# Patient Record
Sex: FEMALE | Race: WHITE | Employment: UNEMPLOYED | URBAN - METROPOLITAN AREA
[De-identification: names, ages, dates, MRNs, and addresses within clinical notes are randomized per-mention and may not be internally consistent; named-entity substitution may affect disease eponyms.]

---

## 2021-09-08 PROBLEM — Z34.90 SUPERVISION OF LOW-RISK PREGNANCY: Status: ACTIVE | Noted: 2021-09-08

## 2021-09-08 PROBLEM — J45.909 ASTHMA: Status: ACTIVE | Noted: 2021-09-08

## 2021-10-12 PROBLEM — O43.199 MARGINAL INSERTION OF UMBILICAL CORD AFFECTING MANAGEMENT OF MOTHER: Status: ACTIVE | Noted: 2021-10-12

## 2021-12-28 PROBLEM — O26.843 SIZE OF FETUS INCONSISTENT WITH DATES IN THIRD TRIMESTER: Status: ACTIVE | Noted: 2021-12-28

## 2022-02-11 PROBLEM — O36.5930 POOR FETAL GROWTH AFFECTING MANAGEMENT OF MOTHER IN THIRD TRIMESTER: Status: ACTIVE | Noted: 2022-02-11

## 2022-02-20 ENCOUNTER — HOSPITAL ENCOUNTER (INPATIENT)
Age: 32
LOS: 3 days | Discharge: HOME | End: 2022-02-23
Attending: OBSTETRICS & GYNECOLOGY | Admitting: OBSTETRICS & GYNECOLOGY
Payer: COMMERCIAL

## 2022-02-20 LAB
ABO + RH BLD: NORMAL
BLOOD GROUP ANTIBODIES SERPL: NORMAL
ERYTHROCYTE [DISTWIDTH] IN BLOOD BY AUTOMATED COUNT: 13.4 % (ref 11.9–14.6)
HCT VFR BLD AUTO: 38.5 % (ref 35.8–46.3)
HGB BLD-MCNC: 12.9 G/DL (ref 11.7–15.4)
MCH RBC QN AUTO: 30.7 PG (ref 26.1–32.9)
MCHC RBC AUTO-ENTMCNC: 33.5 G/DL (ref 31.4–35)
MCV RBC AUTO: 91.7 FL (ref 79.6–97.8)
NRBC # BLD: 0 K/UL (ref 0–0.2)
PLATELET # BLD AUTO: 237 K/UL (ref 150–450)
PMV BLD AUTO: 10.4 FL (ref 9.4–12.3)
RBC # BLD AUTO: 4.2 M/UL (ref 4.05–5.2)
SPECIMEN EXP DATE BLD: NORMAL
WBC # BLD AUTO: 14.9 K/UL (ref 4.3–11.1)

## 2022-02-20 PROCEDURE — 65270000029 HC RM PRIVATE

## 2022-02-20 PROCEDURE — 85027 COMPLETE CBC AUTOMATED: CPT

## 2022-02-20 PROCEDURE — 74011250637 HC RX REV CODE- 250/637: Performed by: OBSTETRICS & GYNECOLOGY

## 2022-02-20 PROCEDURE — 86900 BLOOD TYPING SEROLOGIC ABO: CPT

## 2022-02-20 PROCEDURE — 36415 COLL VENOUS BLD VENIPUNCTURE: CPT

## 2022-02-20 RX ORDER — LIDOCAINE HYDROCHLORIDE 20 MG/ML
JELLY TOPICAL
Status: DISCONTINUED | OUTPATIENT
Start: 2022-02-20 | End: 2022-02-21 | Stop reason: HOSPADM

## 2022-02-20 RX ORDER — BUTORPHANOL TARTRATE 2 MG/ML
1 INJECTION INTRAMUSCULAR; INTRAVENOUS
Status: DISCONTINUED | OUTPATIENT
Start: 2022-02-20 | End: 2022-02-21 | Stop reason: HOSPADM

## 2022-02-20 RX ORDER — LIDOCAINE HYDROCHLORIDE 10 MG/ML
1 INJECTION INFILTRATION; PERINEURAL
Status: DISCONTINUED | OUTPATIENT
Start: 2022-02-20 | End: 2022-02-21 | Stop reason: HOSPADM

## 2022-02-20 RX ORDER — MINERAL OIL
120 OIL (ML) ORAL
Status: COMPLETED | OUTPATIENT
Start: 2022-02-20 | End: 2022-02-21

## 2022-02-20 RX ADMIN — Medication 50 MCG: at 17:30

## 2022-02-20 RX ADMIN — Medication 50 MCG: at 21:36

## 2022-02-20 NOTE — PROGRESS NOTES
Pt arrived to L&D for scheduled ripening for IUGR. Pt to be ripened overnight and induced tomorrow AM. Pt denies ctx, LOF, bleeding. +FM.

## 2022-02-21 ENCOUNTER — ANESTHESIA (OUTPATIENT)
Dept: LABOR AND DELIVERY | Age: 32
End: 2022-02-21
Payer: COMMERCIAL

## 2022-02-21 ENCOUNTER — ANESTHESIA EVENT (OUTPATIENT)
Dept: LABOR AND DELIVERY | Age: 32
End: 2022-02-21
Payer: COMMERCIAL

## 2022-02-21 PROCEDURE — A4300 CATH IMPL VASC ACCESS PORTAL: HCPCS | Performed by: NURSE ANESTHETIST, CERTIFIED REGISTERED

## 2022-02-21 PROCEDURE — 77030011945 HC CATH URIN INT ST MENT -A

## 2022-02-21 PROCEDURE — 59400 OBSTETRICAL CARE: CPT | Performed by: OBSTETRICS & GYNECOLOGY

## 2022-02-21 PROCEDURE — 77030007880 HC KT SPN EPDRL BBMI -B: Performed by: NURSE ANESTHETIST, CERTIFIED REGISTERED

## 2022-02-21 PROCEDURE — 77030014125 HC TY EPDRL BBMI -B: Performed by: NURSE ANESTHETIST, CERTIFIED REGISTERED

## 2022-02-21 PROCEDURE — 74011000250 HC RX REV CODE- 250: Performed by: OBSTETRICS & GYNECOLOGY

## 2022-02-21 PROCEDURE — 2709999900 HC NON-CHARGEABLE SUPPLY

## 2022-02-21 PROCEDURE — 75410000003 HC RECOV DEL/VAG/CSECN EA 0.5 HR

## 2022-02-21 PROCEDURE — 3E0P7VZ INTRODUCTION OF HORMONE INTO FEMALE REPRODUCTIVE, VIA NATURAL OR ARTIFICIAL OPENING: ICD-10-PCS | Performed by: OBSTETRICS & GYNECOLOGY

## 2022-02-21 PROCEDURE — 0UQGXZZ REPAIR VAGINA, EXTERNAL APPROACH: ICD-10-PCS | Performed by: OBSTETRICS & GYNECOLOGY

## 2022-02-21 PROCEDURE — 74011250636 HC RX REV CODE- 250/636: Performed by: NURSE ANESTHETIST, CERTIFIED REGISTERED

## 2022-02-21 PROCEDURE — 74011250636 HC RX REV CODE- 250/636: Performed by: OBSTETRICS & GYNECOLOGY

## 2022-02-21 PROCEDURE — 75410000002 HC LABOR FEE PER 1 HR

## 2022-02-21 PROCEDURE — 74011250637 HC RX REV CODE- 250/637: Performed by: OBSTETRICS & GYNECOLOGY

## 2022-02-21 PROCEDURE — 77030003028 HC SUT VCRL J&J -A

## 2022-02-21 PROCEDURE — 75410000000 HC DELIVERY VAGINAL/SINGLE

## 2022-02-21 PROCEDURE — 77030039147 HC PWDR HEMSTS SURGICEL JNJ -D

## 2022-02-21 PROCEDURE — 74011000250 HC RX REV CODE- 250: Performed by: NURSE ANESTHETIST, CERTIFIED REGISTERED

## 2022-02-21 PROCEDURE — 74011000250 HC RX REV CODE- 250: Performed by: ANESTHESIOLOGY

## 2022-02-21 PROCEDURE — 76060000078 HC EPIDURAL ANESTHESIA

## 2022-02-21 PROCEDURE — 77030018846 HC SOL IRR STRL H20 ICUM -A

## 2022-02-21 PROCEDURE — 00HU33Z INSERTION OF INFUSION DEVICE INTO SPINAL CANAL, PERCUTANEOUS APPROACH: ICD-10-PCS | Performed by: ANESTHESIOLOGY

## 2022-02-21 PROCEDURE — 65270000029 HC RM PRIVATE

## 2022-02-21 RX ORDER — EPHEDRINE SULFATE/0.9% NACL/PF 50 MG/5 ML
SYRINGE (ML) INTRAVENOUS AS NEEDED
Status: DISCONTINUED | OUTPATIENT
Start: 2022-02-21 | End: 2022-02-21 | Stop reason: HOSPADM

## 2022-02-21 RX ORDER — SODIUM CHLORIDE 0.9 % (FLUSH) 0.9 %
5-40 SYRINGE (ML) INJECTION EVERY 8 HOURS
Status: DISCONTINUED | OUTPATIENT
Start: 2022-02-21 | End: 2022-02-21

## 2022-02-21 RX ORDER — DIPHENHYDRAMINE HCL 25 MG
25 CAPSULE ORAL
Status: DISCONTINUED | OUTPATIENT
Start: 2022-02-21 | End: 2022-02-23 | Stop reason: HOSPADM

## 2022-02-21 RX ORDER — ROPIVACAINE HYDROCHLORIDE 2 MG/ML
INJECTION, SOLUTION EPIDURAL; INFILTRATION; PERINEURAL
Status: DISCONTINUED | OUTPATIENT
Start: 2022-02-21 | End: 2022-02-21 | Stop reason: HOSPADM

## 2022-02-21 RX ORDER — IBUPROFEN 600 MG/1
600 TABLET ORAL EVERY 6 HOURS
Status: DISCONTINUED | OUTPATIENT
Start: 2022-02-21 | End: 2022-02-23 | Stop reason: HOSPADM

## 2022-02-21 RX ORDER — ONDANSETRON 2 MG/ML
4 INJECTION INTRAMUSCULAR; INTRAVENOUS
Status: DISCONTINUED | OUTPATIENT
Start: 2022-02-21 | End: 2022-02-21

## 2022-02-21 RX ORDER — SODIUM CHLORIDE 0.9 % (FLUSH) 0.9 %
5-40 SYRINGE (ML) INJECTION AS NEEDED
Status: DISCONTINUED | OUTPATIENT
Start: 2022-02-21 | End: 2022-02-21

## 2022-02-21 RX ORDER — LIDOCAINE HYDROCHLORIDE AND EPINEPHRINE 15; 5 MG/ML; UG/ML
INJECTION, SOLUTION EPIDURAL AS NEEDED
Status: DISCONTINUED | OUTPATIENT
Start: 2022-02-21 | End: 2022-02-21

## 2022-02-21 RX ORDER — LIDOCAINE HYDROCHLORIDE AND EPINEPHRINE 20; 5 MG/ML; UG/ML
INJECTION, SOLUTION EPIDURAL; INFILTRATION; INTRACAUDAL; PERINEURAL AS NEEDED
Status: DISCONTINUED | OUTPATIENT
Start: 2022-02-21 | End: 2022-02-21 | Stop reason: HOSPADM

## 2022-02-21 RX ORDER — DOCUSATE SODIUM 100 MG/1
100 CAPSULE, LIQUID FILLED ORAL 2 TIMES DAILY
Status: DISCONTINUED | OUTPATIENT
Start: 2022-02-21 | End: 2022-02-23 | Stop reason: HOSPADM

## 2022-02-21 RX ORDER — OXYTOCIN/RINGER'S LACTATE 30/500 ML
2 PLASTIC BAG, INJECTION (ML) INTRAVENOUS
Status: DISCONTINUED | OUTPATIENT
Start: 2022-02-21 | End: 2022-02-21

## 2022-02-21 RX ORDER — OXYCODONE HYDROCHLORIDE 5 MG/1
5-10 TABLET ORAL
Status: DISCONTINUED | OUTPATIENT
Start: 2022-02-21 | End: 2022-02-23 | Stop reason: HOSPADM

## 2022-02-21 RX ORDER — ONDANSETRON 2 MG/ML
4 INJECTION INTRAMUSCULAR; INTRAVENOUS
Status: DISCONTINUED | OUTPATIENT
Start: 2022-02-21 | End: 2022-02-23 | Stop reason: HOSPADM

## 2022-02-21 RX ORDER — FENTANYL CITRATE 50 UG/ML
INJECTION, SOLUTION INTRAMUSCULAR; INTRAVENOUS
Status: COMPLETED
Start: 2022-02-21 | End: 2022-02-21

## 2022-02-21 RX ORDER — FENTANYL CITRATE 50 UG/ML
INJECTION, SOLUTION INTRAMUSCULAR; INTRAVENOUS AS NEEDED
Status: DISCONTINUED | OUTPATIENT
Start: 2022-02-21 | End: 2022-02-21 | Stop reason: HOSPADM

## 2022-02-21 RX ORDER — SIMETHICONE 80 MG
80 TABLET,CHEWABLE ORAL
Status: DISCONTINUED | OUTPATIENT
Start: 2022-02-21 | End: 2022-02-23 | Stop reason: HOSPADM

## 2022-02-21 RX ORDER — DEXTROSE, SODIUM CHLORIDE, SODIUM LACTATE, POTASSIUM CHLORIDE, AND CALCIUM CHLORIDE 5; .6; .31; .03; .02 G/100ML; G/100ML; G/100ML; G/100ML; G/100ML
125 INJECTION, SOLUTION INTRAVENOUS CONTINUOUS
Status: DISCONTINUED | OUTPATIENT
Start: 2022-02-21 | End: 2022-02-21

## 2022-02-21 RX ORDER — ACETAMINOPHEN 325 MG/1
650 TABLET ORAL EVERY 4 HOURS
Status: DISCONTINUED | OUTPATIENT
Start: 2022-02-21 | End: 2022-02-22

## 2022-02-21 RX ORDER — LIDOCAINE HYDROCHLORIDE AND EPINEPHRINE 15; 5 MG/ML; UG/ML
INJECTION, SOLUTION EPIDURAL
Status: COMPLETED | OUTPATIENT
Start: 2022-02-21 | End: 2022-02-21

## 2022-02-21 RX ORDER — FENTANYL CITRATE 50 UG/ML
100 INJECTION, SOLUTION INTRAMUSCULAR; INTRAVENOUS ONCE
Status: DISCONTINUED | OUTPATIENT
Start: 2022-02-21 | End: 2022-02-21

## 2022-02-21 RX ORDER — OXYTOCIN/RINGER'S LACTATE 30/500 ML
87.3 PLASTIC BAG, INJECTION (ML) INTRAVENOUS AS NEEDED
Status: DISCONTINUED | OUTPATIENT
Start: 2022-02-21 | End: 2022-02-21

## 2022-02-21 RX ORDER — SILVER NITRATE 38.21; 12.74 MG/1; MG/1
1 STICK TOPICAL
Status: COMPLETED | OUTPATIENT
Start: 2022-02-21 | End: 2022-02-21

## 2022-02-21 RX ORDER — OXYTOCIN/RINGER'S LACTATE 30/500 ML
0-20 PLASTIC BAG, INJECTION (ML) INTRAVENOUS
Status: DISCONTINUED | OUTPATIENT
Start: 2022-02-21 | End: 2022-02-21

## 2022-02-21 RX ORDER — OXYTOCIN/RINGER'S LACTATE 30/500 ML
10 PLASTIC BAG, INJECTION (ML) INTRAVENOUS AS NEEDED
Status: DISCONTINUED | OUTPATIENT
Start: 2022-02-21 | End: 2022-02-21

## 2022-02-21 RX ADMIN — Medication 1 SPRAY: at 12:15

## 2022-02-21 RX ADMIN — IBUPROFEN 600 MG: 600 TABLET ORAL at 17:48

## 2022-02-21 RX ADMIN — FENTANYL CITRATE 100 MCG: 50 INJECTION INTRAMUSCULAR; INTRAVENOUS at 04:25

## 2022-02-21 RX ADMIN — Medication 2 MILLI-UNITS/MIN: at 08:22

## 2022-02-21 RX ADMIN — IBUPROFEN 600 MG: 600 TABLET ORAL at 23:16

## 2022-02-21 RX ADMIN — MINERAL OIL 120 ML: 1000 LIQUID ORAL at 08:49

## 2022-02-21 RX ADMIN — Medication 1 APPLICATOR: at 10:45

## 2022-02-21 RX ADMIN — ONDANSETRON 4 MG: 2 INJECTION INTRAMUSCULAR; INTRAVENOUS at 01:22

## 2022-02-21 RX ADMIN — DOCUSATE SODIUM 100 MG: 100 CAPSULE ORAL at 17:48

## 2022-02-21 RX ADMIN — LIDOCAINE HYDROCHLORIDE,EPINEPHRINE BITARTRATE 4.5 ML: 15; .005 INJECTION, SOLUTION EPIDURAL; INFILTRATION; INTRACAUDAL; PERINEURAL at 04:24

## 2022-02-21 RX ADMIN — ACETAMINOPHEN 650 MG: 325 TABLET, FILM COATED ORAL at 23:15

## 2022-02-21 RX ADMIN — DOCUSATE SODIUM 100 MG: 100 CAPSULE ORAL at 12:16

## 2022-02-21 RX ADMIN — ROPIVACAINE HYDROCHLORIDE 8 ML/HR: 2 INJECTION, SOLUTION EPIDURAL; INFILTRATION at 04:32

## 2022-02-21 RX ADMIN — Medication 10 MG: at 04:31

## 2022-02-21 RX ADMIN — BUTORPHANOL TARTRATE 1 MG: 2 INJECTION, SOLUTION INTRAMUSCULAR; INTRAVENOUS at 01:22

## 2022-02-21 RX ADMIN — ACETAMINOPHEN 650 MG: 325 TABLET, FILM COATED ORAL at 16:10

## 2022-02-21 RX ADMIN — IBUPROFEN 600 MG: 600 TABLET ORAL at 12:16

## 2022-02-21 RX ADMIN — OXYCODONE 10 MG: 5 TABLET ORAL at 13:52

## 2022-02-21 RX ADMIN — LIDOCAINE HYDROCHLORIDE,EPINEPHRINE BITARTRATE 5 ML: 20; .005 INJECTION, SOLUTION EPIDURAL; INFILTRATION; INTRACAUDAL; PERINEURAL at 08:59

## 2022-02-21 RX ADMIN — WITCH HAZEL 1 PAD: 500 SOLUTION RECTAL; TOPICAL at 12:15

## 2022-02-21 NOTE — PROGRESS NOTES
02/21/22 0114   Cervical Exam   Dilation (cm) 2   Eff 90 %   Station -2   Position Mid   Cervical Consistency Soft   Vaginal exam done by?  cb, rn   Baby Position Vertex   pt called RN to bedside. Reports pain 7/10. Requesting IV pain medication at this time. SVE as above.

## 2022-02-21 NOTE — PROGRESS NOTES
SBAR OUT Report: BABY    Verbal report given to Selin Lorenzo (full name and credentials) on this patient, being transferred to MIU (unit) for routine progression of care. Report consisted of Situation, Background, Assessment, and Recommendations (SBAR).  ID bands were compared with the identification form, and verified with the patient's mother and receiving nurse. Information from the SBAR and the Leann Report was reviewed with the receiving nurse. According to the estimated gestational age scale, this infant is AGA. BETA STREP:   The mother's Group Beta Strep (GBS) result was negative. Prenatal care was received by this patients mother. Opportunity for questions and clarification provided.

## 2022-02-21 NOTE — PROGRESS NOTES
Safety Teaching reviewed:   1. Hand hygiene prior to handling the infant. 2. Use of bulb syringe  3. Bracelets with matching numbers are placed on mother and infant  3. An infant security tag  [facility]) is placed on the infant's ankle and monitored  5. All OB nurses wear pink Employee badges - do not give your baby to anyone without proper identification. 6. Never leave the baby alone in the room. 7. The infant should be placed on their back to sleep. on a firm mattress. No toys should be placed in the crib. (safe sleep video offered to view)  8. Never shake the baby (video offered to view)  9. Infant fall prevention - do not sleep with the baby, and place the baby in the crib while ambulating. 8. Mother and Baby Care booklet given to Mother.

## 2022-02-21 NOTE — L&D DELIVERY NOTE
Delivery Summary    Patient: Adenike Scales MRN: [id number]  SSN: [id number]    YOB: 1990  Age: 27 y.o. Sex: female       Information for the patient's :  Rosalinda Carrel [[id number]]       Labor Events:    Labor: No    Steroids: None   Cervical Ripening Date/Time: 2022 5:30 PM   Cervical Ripening Type: Misoprostol   Antibiotics During Labor: No   Rupture Identifier:      Rupture Date/Time: 2022 10:20 PM   Rupture Type: SROM   Amniotic Fluid Volume:  Moderate    Amniotic Fluid Description: Clear    Amniotic Fluid Odor: None    Induction: None       Induction Date/Time:        Indications for Induction:      Augmentation: Oxytocin   Augmentation Date/Time: 28:22 AM   Indications for Augmentation: Ineffective Contraction Pattern   Labor complications: None       Additional complications:        Delivery Events:  Indications For Episiotomy:     Episiotomy:     Perineal Laceration(s):     Repaired:     Periurethral Laceration Location:      Repaired:     Labial Laceration Location:     Repaired:     Sulcal Laceration Location:     Repaired:     Vaginal Laceration Location: bilateral   Repaired: Yes   Cervical Laceration Location:     Repaired:     Repair Suture: Vicryl 3-0   Number of Repair Packets:     Estimated Blood Loss (ml):  ml   Quantitative Blood Loss (ml)                Delivery Date: 2022    Delivery Time: 9:00 AM  Delivery Type: Vaginal, Spontaneous  Sex:  Female    Gestational Age: 39w0d   Delivery Clinician:  Shyann Hyatt  Living Status: Living   Delivery Location: &D [facility]          APGARS  One minute Five minutes Ten minutes   Skin color: 0   1        Heart rate: 2   2        Grimace: 2   2        Muscle tone: 2   2        Breathin   2        Totals: 8   9            Presentation: Vertex    Position: Right Occiput Anterior  Resuscitation Method:  Tactile Stimulation;Suctioning-bulb     Meconium Stained: None      Cord Information: 3 Vessels  Complications: None  Cord around:    Delayed cord clamping? Cord clamped date/time:2022  9:01 AM  Disposition of Cord Blood: Lab    Blood Gases Sent?: No    Placenta:  Date/Time: 2022  9:03 AM  Removal: Spontaneous      Appearance: Normal      Measurements:  Birth Weight:        Birth Length:        Head Circumference:        Chest Circumference:       Abdominal Girth: Other Providers:   DILLON Petty;DIAMOND MOURA;YASH MENENDEZ;HERIBERTO SAMANO;GARRY SWANSON;MINA KHAN;VALENCIA SÁNCHEZ, Obstetrician;Primary Nurse;Primary  Nurse; Anesthesiologist;Crna;Staff Nurse;Charge Nurse;Scrub Tech           Group B Strep: No results found for: GRBSEXT, GRBSEXT  Information for the patient's :  Angélica Casey [[id number]]   No results found for: ABORH, PCTABR, PCTDIG, BILI, ABORHEXT, ABORH     No results for input(s): PCO2CB, PO2CB, HCO3I, SO2I, IBD, PTEMPI, SPECTI, PHICB, ISITE, IDEV, IALLEN in the last 72 hours.  of a Viable I on 22  Apgars 8, 9  C/C/+2-->pushed to deliver head onto intact perineum. Body followed easily thereafter. Delayed cord clamping, baby to mom. Cord clamped/cut. Cord gases were drawn. Placenta delivered S/I/3VC. Bilateral deep vaginal lacerations noted. Additionally at midline entire vaginal mucosa was sheared off creating a large amount of dead space. Rectal exam was performed with intact rectal sphincter. This was repaired in a running locking fashion and with figure of eight sutures using Vicryl. With every stick the mucosal tissue become more oozy. Hemostasis was eventually achieved after several sutures. Repeat rectal exam was performed with no sutures present and intact sphincter. Vaginal packing was placed. Will re-evaluate in 6 hours and likely remove. Bladder was emptied. FF w/ pit and massage. QBL per RN. .  Mom/baby stable.        Amber Hyatt, DO

## 2022-02-21 NOTE — PROGRESS NOTES
Called back to bedside as patient saturated vaginal packing. Packing removed and 1 area of brisk oozing noted at area of prior figure of eight suture with the other areas with slow ooze. 2 additional figure of eights placed with heavy bleeding controlled. I asked the hospitalist to step in to evaluate with me as patient has already had several sutures and a vaginal packing. While on the way pressure was held. Vaginal mucosa with mild oozing, but overall much improved after pressure was held and additional sutures were placed. Mild oozing noted on denuded edges, but otherwise heavy bleeding controlled. Neetu and silver nitrate was used on the denuded and friable edges of mucosa. Another vaginal pack was placed as well as العلي catheter.      Amber Hyatt, DO

## 2022-02-21 NOTE — PROGRESS NOTES
Eladia Hoover at bedside at 2867. CRNA Griffith Lesches bedside at 3101    Assisted pt to sitting up on bedside at 0412. Timeout completed at 8294 with MD, JIM and myself at bedside. Test dose given at 0424. Negative reaction. Pt assisted to lying back in left tilt position. See anesthesia record for details. See vital sign flow sheet for BP. Tolerated procedure well.

## 2022-02-21 NOTE — PROGRESS NOTES
02/21/22 5487   Cervical Exam   Dilation (cm) 10   Eff 100 %   Station +1   Vaginal exam done by?  cb, rn   Baby Position Vertex   Dilation Complete Date 02/21/22   Dilation Time Complete 9415   Second Stage of Labor   Date Provider Notified 02/21/22   Time Provider Notified 1365   Dr. Sonam Hinson updated on pt status.

## 2022-02-21 NOTE — ANESTHESIA PREPROCEDURE EVALUATION
Relevant Problems   No relevant active problems       Anesthetic History   No history of anesthetic complications            Review of Systems / Medical History  Patient summary reviewed and pertinent labs reviewed    Pulmonary            Asthma : well controlled       Neuro/Psych   Within defined limits           Cardiovascular  Within defined limits                Exercise tolerance: >4 METS     GI/Hepatic/Renal     GERD: well controlled           Endo/Other  Within defined limits           Other Findings              Physical Exam    Airway  Mallampati: II  TM Distance: 4 - 6 cm  Neck ROM: normal range of motion   Mouth opening: Normal     Cardiovascular  Regular rate and rhythm,  S1 and S2 normal,  no murmur, click, rub, or gallop             Dental         Pulmonary  Breath sounds clear to auscultation               Abdominal         Other Findings            Anesthetic Plan    ASA: 2  Anesthesia type: CSE      Post-op pain plan if not by surgeon: indwelling epidural catheter, epidural opioid and intrathecal opiates      Anesthetic plan and risks discussed with: Patient and Spouse

## 2022-02-21 NOTE — PROGRESS NOTES
Moderate bleeding noted on fundal exam. Vag pack saturated. Dr. Caitlyn Lawton notified. MD will come assess.

## 2022-02-21 NOTE — PROGRESS NOTES
Iván Alvarez at bedside for delivery. 1079- Pt up in [facility]. 0900- Delivery of viable female infant. Apgars 8,9.  [id number]- Delivery of placenta. Pitocin bolus started. Repair in progress.

## 2022-02-21 NOTE — PROGRESS NOTES
SBAR IN Report: Mother    Verbal report received from RANDY Conway RN (full name & credentials) on this patient, who is now being transferred from [facility] (unit) for routine progression of care. The patient is not wearing a green \"Anesthesia-Duramorph\" band. Report consisted of patient's Situation, Background, Assessment and Recommendations (SBAR).  ID bands were compared with the identification form, and verified with the patient and transferring nurse. Information from the Procedure Summary and the Leann Report was reviewed with the transferring nurse; opportunity for questions and clarification provided.

## 2022-02-21 NOTE — PROGRESS NOTES
Pt called RN to bedside. States she felt a \"gush of fluid and blood. \" Nitrazine positive.  SVE 2/60/-2

## 2022-02-21 NOTE — H&P
History and Physical    Labor Induction Admission Note    Kayli Fritz  [id number]  unknown    OB History        2    Para        Term                AB   1    Living           SAB   1    IAB        Ectopic        Molar        Multiple        Live Births                    Patient admitted with pregnancy at 39w0d for induction of labor due to IUGR. Prenatal course has otherwise been uncomplicated. She received 2 doses of cytotec overnight. Prior to Admission Medications   Prescriptions Last Dose Informant Patient Reported? Taking?   prenatal multivit-ca-min-fe-fa tab 2022 at Unknown time  Yes Yes   Sig: Take  by mouth. Facility-Administered Medications: None         EXAM: Cervical Exam:  C/c/+1 per RN. Pushing starting now     Membranes: SROM around 2200                Fetal Heart Rate: cat1       Labs:   Lab Results   Component Value Date/Time    ABO/Rh(D) AB POSITIVE 2022 05:09 PM    Antibody screen NEG 2022 05:09 PM    Antibody screen, External Negative 2021 12:00 AM    HBsAg, External Negative 2021 12:00 AM    HIV, External Non reactive 2021 12:00 AM    Rubella, External Immune 2021 12:00 AM    ABO,Rh AB positive 2021 12:00 AM          Plan: Admit for induction of labor. Group B Strep negative. - Complete. Start pushing. FHTs reassuring.      Bhumika Guevara DO  2022   7:43 AM

## 2022-02-21 NOTE — PROGRESS NOTES
Dr. Shyla Genao at bedside. Bleeding assessed. Pt back up in [facility] for repair. Repair in progress at this time.

## 2022-02-21 NOTE — PROGRESS NOTES
SBAR OUT Report: Mother    Verbal report given to Kirill Lucero (full name & credentials) on this patient, who is now being transferred to MIU (unit) for routine progression of care. The patient is not wearing a green \"Anesthesia-Duramorph\" band. Report consisted of patient's Situation, Background, Assessment and Recommendations (SBAR). [place] ID bands were compared with the identification form, and verified with the patient and receiving nurse. Information from the SBAR and the [employer] Report was reviewed with the receiving nurse; opportunity for questions and clarification provided.

## 2022-02-21 NOTE — PROGRESS NOTES
Second repair complete. Vag pack replaced by Dr. Shyla Genao. Counts correct. Per MD, she will reassess in 1 hr.

## 2022-02-21 NOTE — LACTATION NOTE

## 2022-02-21 NOTE — PROGRESS NOTES
Spoke with Dr. Andrea Mueller on the phone. MD states he will s/w Dr. Sampson Hurtado regarding delivery- will await MD order prior to initiating pushing.

## 2022-02-21 NOTE — LACTATION NOTE
This note was copied from a baby's chart. In to see mom and infant for the first time. Mom stated that infant was ready to nurse. Assisted mom with placing infant to her right breast in the football hold. Infant latched and started to suck rhythmically. Infant nursed for 15 minutes and came off the breast content. Mom then placed infant to her left breast in the cross cradle hold but mom couldn't get infant into a comfortable positron. Mom then placed infant in the football hold and infant latched and sucked rhythmically for 5 minutes and came off the breast content. Reviewed the expectations of the first 24 hours. Lactation consultant will follow up tomorrow.

## 2022-02-22 PROCEDURE — 65270000029 HC RM PRIVATE

## 2022-02-22 PROCEDURE — 74011250637 HC RX REV CODE- 250/637: Performed by: OBSTETRICS & GYNECOLOGY

## 2022-02-22 PROCEDURE — 2709999900 HC NON-CHARGEABLE SUPPLY

## 2022-02-22 RX ORDER — ACETAMINOPHEN 325 MG/1
650 TABLET ORAL EVERY 4 HOURS
Status: DISCONTINUED | OUTPATIENT
Start: 2022-02-22 | End: 2022-02-23 | Stop reason: HOSPADM

## 2022-02-22 RX ADMIN — IBUPROFEN 600 MG: 600 TABLET ORAL at 05:53

## 2022-02-22 RX ADMIN — ACETAMINOPHEN 650 MG: 325 TABLET, FILM COATED ORAL at 05:53

## 2022-02-22 RX ADMIN — ACETAMINOPHEN 650 MG: 325 TABLET, FILM COATED ORAL at 10:10

## 2022-02-22 RX ADMIN — ACETAMINOPHEN 650 MG: 325 TABLET, FILM COATED ORAL at 13:57

## 2022-02-22 RX ADMIN — DOCUSATE SODIUM 100 MG: 100 CAPSULE ORAL at 08:23

## 2022-02-22 RX ADMIN — IBUPROFEN 600 MG: 600 TABLET ORAL at 12:06

## 2022-02-22 RX ADMIN — ACETAMINOPHEN 650 MG: 325 TABLET, FILM COATED ORAL at 17:53

## 2022-02-22 RX ADMIN — DOCUSATE SODIUM 100 MG: 100 CAPSULE ORAL at 17:53

## 2022-02-22 NOTE — PROGRESS NOTES
Progress Note                               Patient: Hodan Parks MRN: [id number]  SSN: [id number]    YOB: 1990  Age: 27 y.o. Sex: female      Postpartum Day Number 1    Subjective:     Patient doing well postpartum without significant complaints. Jimenez still in place secondary to vaginal packing. Pain well controlled. No n/v. Denies HA, SOB/CP, RUQ pain, Vision changes. Objective:     Patient Vitals for the past 12 hrs:   Temp Pulse Resp BP SpO2   22 0727 98.4 °F (36.9 °C) 72 18 107/75 97 %   22 2252 97.4 °F (36.3 °C) 74 18 113/68 98 %       Temp (24hrs), Av °F (36.7 °C), Min:97.4 °F (36.3 °C), Max:98.6 °F (37 °C)      Date 22 07 - 22 0659 22 07 - 22 0659   Shift 5545-6360 7612-7207 24 Hour Total 4253-6533 1590-1348 24 Hour Total   INTAKE   I.V.(mL/kg/hr) 1325(1.2)  1325(0.6)        I.V. 1325  1325      Shift Total(mL/kg) 1325(14.2)  1325(14.2)      OUTPUT   Urine(mL/kg/hr) 3000(2.7) 2400(2.1) 5400(2.4) 1000  1000     Urine 500  500        Urine Output (mL) (Urinary Catheter 22 Jimenez) 2500 2400 4900 1000  1000   Blood 531  531        Quantitative Blood Loss (mL) 531  531      Shift Total(mL/kg) 3531(37.8) 2400(25.7) 5931(63.5) 1000(10.7)  1000(10.7)   NET -2206 -2400 -4606 -1000  -1000   Weight (kg) 93.4 93.4 93.4 93.4 93.4 93.4         Physical Exam:    Constitutional: She appears well-developed and well-nourished. No distress. HENT:    Head: Normocephalic and atraumatic. Cardiovascular: RRR  Pulmonary/Chest: CTAB  Abd:  S/NTTP/ND, BS normoactive, fundus firm at umbilicus  Ext:  No c/c/e      Lab/Data Review:  PI:   Recent Labs     22  1709   WBC 14.9*   HGB 12.9   HCT 38.5       No results for input(s): LDH, URICA, MG, PUQ in the last 72 hours.   GBS: No results found for: GRBSEXT, GRBSEXT  Blood Type:   Lab Results   Component Value Date/Time    ABO/Rh(D) AB POSITIVE 2022 05:09 PM    ABO,Rh AB positive 2021 12:00 AM        Assessment and Plan:     27 y.o.  ppd# 1 s/p  at 39w0d after IOL for IUGR:    1) PP:  Meeting all pp goals, continue routine care; vaginal packing removed by me personally now with only scant oozing w/ fundal pressure. Will monitor closely.       2) Breast feeding, Rh pos, Rub imm           Signed By: Nisa Rosen MD     2022

## 2022-02-22 NOTE — PROGRESS NOTES
This RN notified via phone by Dr. Terry Mahoney that she will round/assess pt soon and to wait to remove cath and vag packing. Orders read back and verified.

## 2022-02-22 NOTE — LACTATION NOTE
This note was copied from a baby's chart. Assisted with breastfeeding in cross cradle and football on R.  Baby fed fairly well. She is very anxious to latch, but tends to be shallow. Demonstrated manual lip flange. Encouraged frequent feeding and watch output. Mom getting sore, encouraged changing position, lip flange, lanolin or olive/coconut oil. Suggested get baby started sucking on finger to help get in a good rhythm. Mom can hand express before feeding to get milk started and pull out nipple. Colostrum is thick and there is not much volume, so baby can put a lot of pressure on the nipple before swallowing. Once milk volume is in and flowing well, pressure should decrease. Encouraged mom to report any nipple damage or bleeding beyond soreness.

## 2022-02-22 NOTE — PROGRESS NOTES
Shift assessment complete see flowsheet. Discussed today plan of care with pt. Bleeding precautions given. Pt still has vaginal packing in place, awaiting MD to round to see if packing and العلي can be removed. العلي emptied for 1000ml of clear urine. No s/s of distress noted. Pt to call with needs/concerns. Pt in bed with call light in reach. Questions encouraged and answered.

## 2022-02-22 NOTE — PROGRESS NOTES
In room making routine rounds, pt c/o nausea that \"recently started\". Pt states she hasn't ate in a while. Brought saltines and peanut butter to bedside. Offered to get pt Zofran or Phenergan, pt declines. Pt to let RN know if she changes her mind about medications. Pt in bed with call light in reach.

## 2022-02-22 NOTE — PROGRESS NOTES
1054-Dr. Dodge at bedside assessing pt, vaginal packing removed by MD. Verbal orders received to remove العلي at this time. Orders read back and verified  1058-After complete deflating pt urinary catheter balloon. Pt العلي was removed without any issues. Pt resting in bed quietly with call light in reach. Karla care performed. Karla pad changed. Ice pack applied to perineum. This RN unable to measure the urine in العلي due to the fact that the urinary hat fell in the toilet while emptying العلي bag and water got in the hat. Pt did have approximately 400ml in العلي bag upon emptying. 1106-pt up and ambulated to bathroom. Pt able to void 180ml. No clots noted in toilet. Karla care taught and performed by pt. Mesh underwear applied along with tucks and dermoplast. Gown changed. Pt tolerated ambulation well. Pt to call with needs/concerns.

## 2022-02-23 VITALS
SYSTOLIC BLOOD PRESSURE: 113 MMHG | WEIGHT: 206 LBS | HEIGHT: 64 IN | OXYGEN SATURATION: 99 % | RESPIRATION RATE: 16 BRPM | TEMPERATURE: 98 F | DIASTOLIC BLOOD PRESSURE: 79 MMHG | HEART RATE: 80 BPM | BODY MASS INDEX: 35.17 KG/M2

## 2022-02-23 PROCEDURE — 74011250637 HC RX REV CODE- 250/637: Performed by: OBSTETRICS & GYNECOLOGY

## 2022-02-23 RX ORDER — IBUPROFEN 800 MG/1
800 TABLET ORAL
Qty: 60 TABLET | Refills: 2 | Status: SHIPPED | OUTPATIENT
Start: 2022-02-23

## 2022-02-23 RX ADMIN — Medication 2 TSP: at 11:41

## 2022-02-23 RX ADMIN — DOCUSATE SODIUM 100 MG: 100 CAPSULE ORAL at 07:40

## 2022-02-23 RX ADMIN — IBUPROFEN 600 MG: 600 TABLET ORAL at 07:39

## 2022-02-23 RX ADMIN — IBUPROFEN 600 MG: 600 TABLET ORAL at 00:00

## 2022-02-23 RX ADMIN — ACETAMINOPHEN 650 MG: 325 TABLET, FILM COATED ORAL at 11:41

## 2022-02-23 NOTE — PROGRESS NOTES
Shift assessment complete as noted. Patient denies needs. Perineum without swelling. Rubra noted on pad. Sweet smell to discharge. Educated on use of sitz bath. Demonstrated use. Encouraged to use at home at least daily. Questions encouraged and answered. Encouraged to call for needs or concerns. Verbalizes understanding.

## 2022-02-23 NOTE — DISCHARGE INSTRUCTIONS
Patient Education   Discharge instruction to follow: Activity: Pelvis rest for 6 weeks     No heavy lifting over 15 lbs for 2 weeks     No driving for 2 weeks     No push/pull motion such as sweeping or vacuuming for 2 weeks     No tub baths for 6 weeks    Continue using the hygenique wand after each void or bowel movement. If using sitz bath continue until comfortable stopping. If using maira-bottle continue to use until comfortable stopping. Change sanitary pad after each urination or bowel movement. Call MD for the following:  Fever over 101 F; pain not relieved by medication; foul smelling vaginal discharge or an increase in vaginal bleeding. Take medication as prescribed. Follow up with MD as order. Vaginal Childbirth: Care Instructions  Overview     Vaginal birth means delivering a baby through the birth canal (vagina). During labor, the uterus tightens (contracts) regularly to thin and open the cervix and to push the baby out through the birth canal.  Your body will slowly heal in the next few weeks. It's easy to get too tired and overwhelmed during the first weeks after your baby is born. Changes in your hormones can shift your mood without warning. You may find it hard to meet the extra demands on your energy and time. Take it easy on yourself. Follow-up care is a key part of your treatment and safety. Be sure to make and go to all appointments, and call your doctor if you are having problems. It's also a good idea to know your test results and keep a list of the medicines you take. How can you care for yourself at home? Vaginal bleeding and cramps  · After delivery, you will have a bloody discharge from your vagina. This will turn pink within a week and then white or yellow after about 10 days. It may last for 2 to 4 weeks or longer, until the uterus has healed. Use sanitary pads until you stop bleeding. Using pads makes it easier to monitor your bleeding.   · Don't worry if you pass some blood clots, as long as they are smaller than a golf ball. If you have a tear or stitches in your vaginal area, change the pad at least every 4 hours. This will help prevent soreness and infection. · You may have cramps for the first few days after childbirth. These are normal and occur as the uterus shrinks to normal size. Take an over-the-counter pain medicine, such as acetaminophen (Tylenol), ibuprofen (Advil, Motrin), or naproxen (Aleve), for cramps. Read and follow all instructions on the label. Do not take aspirin, because it can cause more bleeding. · Do not take two or more pain medicines at the same time unless the doctor told you to. Many pain medicines have acetaminophen, which is Tylenol. Too much acetaminophen (Tylenol) can be harmful. Stitches  · If you have stitches, they will dissolve on their own and don't need to be removed. Follow your doctor's instructions for cleaning the stitched area. · Put ice or a cold pack on your painful area for 10 to 20 minutes at a time, several times a day, for the first few days. Put a thin cloth between the ice and your skin. · Sit in a few inches of warm water (sitz bath) 3 times a day and after bowel movements. The warm water helps with pain and itching. If you don't have a tub, a warm shower might help. Breast fullness  · Your breasts may overfill (engorge) in the first few days after delivery. To help milk flow and to relieve pain, warm your breasts in the shower or by using warm, moist towels before nursing. · If you aren't nursing, don't put warmth on your breasts or touch your breasts. Wear a bra that fits well and use ice until the fullness goes away. This usually takes 2 to 3 days. · Put ice or a cold pack on your breast after nursing to reduce swelling and pain. Put a thin cloth between the ice and your skin. Activity  · Eat a balanced diet. Don't try to lose weight by cutting calories.  Keep taking your prenatal vitamins, or take a multivitamin. · Get as much rest as you can. Try to take naps when your baby sleeps during the day. · Get some exercise every day. But don't do any heavy exercise until your doctor says it is okay. · Wait until you are healed (about 4 to 6 weeks) before you have sexual intercourse. Your doctor will tell you when it is okay to have sex. · If you don't want to get pregnant, talk to your doctor about birth control. You can get pregnant even before your period returns. Also, you can get pregnant while you are breastfeeding. Mental health  · It's normal to have some sadness, anxiety, sleeplessness, and mood swings after you go home. If you feel upset or hopeless for more than a few days or are having trouble doing the things you need to do, talk to your doctor. Constipation and hemorrhoids  · Drink plenty of fluids. If you have kidney, heart, or liver disease and have to limit fluids, talk with your doctor before you increase the amount of fluids you drink. · Eat plenty of fiber each day. Have a bran muffin or bran cereal for breakfast. Try eating a piece of fruit for a mid-afternoon snack. · For painful, itchy hemorrhoids, put ice or a cold pack on the area several times a day for 10 minutes at a time. Follow this by putting a warm compress on the area for another 10 to 20 minutes or by sitting in a shallow, warm bath. When should you call for help? Call 911  anytime you think you may need emergency care. For example, call if:    · You have thoughts of harming yourself, your baby, or another person.     · You passed out (lost consciousness).     · You have chest pain, are short of breath, or cough up blood.     · You have a seizure.    Call your doctor now or seek immediate medical care if:    · You have severe vaginal bleeding.     · You are dizzy or lightheaded, or you feel like you may faint.     · You have a fever.     · You have new or more pain in your belly or pelvis.     · You have symptoms of a blood clot in your leg (called a deep vein thrombosis), such as:  ? Pain in the calf, back of the knee, thigh, or groin. ? Redness and swelling in your leg or groin.     · You have signs of preeclampsia, such as:  ? Sudden swelling of your face, hands, or feet. ? New vision problems (such as dimness, blurring, or seeing spots). ? A severe headache. Watch closely for changes in your health, and be sure to contact your doctor if:    · Your vaginal bleeding seems to be getting heavier.     · You have new or worse vaginal discharge.     · You feel sad, anxious, or hopeless for more than a few days.     · You do not get better as expected. Where can you learn more? Go to http://www.gray.com/  Enter Q237 in the search box to learn more about \"Vaginal Childbirth: Care Instructions. \"  Current as of: June 16, 2021               Content Version: 13.0  © 4298-1758 [employer]. Care instructions adapted under license by [employer] (which disclaims liability or warranty for this information). If you have questions about a medical condition or this instruction, always ask your healthcare professional. [place] [address] any warranty or liability for your use of this information.

## 2022-02-23 NOTE — PROGRESS NOTES
Chart reviewed - first time time parent. SW met with patient while social distancing w/appropriate PPE. Patient has a PCP but cannot recall physician's name at this time.  provided education on [facility].  Family was undecided on need for home visit. No referral will be made at this time. Family has this 's contact information should they decide to participate in program.    Patient given informational packet on  mood & anxiety disorders (resources/education). Family denies any additional needs from  at this time. Family has 's contact information should any needs/questions arise.     JUANI Syed, [address] [facility]   [phone]

## 2022-02-23 NOTE — PROGRESS NOTES
Post-Partum Day Number 2 Progress/Discharge Note    Patient doing well post-partum without significant complaint. Voiding without difficulty, normal lochia. Vitals:  Patient Vitals for the past 8 hrs:   BP Temp Pulse Resp SpO2   22 0715 113/79 98 °F (36.7 °C) 80 16 99 %     Temp (24hrs), Av °F (36.7 °C), Min:97.7 °F (36.5 °C), Max:98.3 °F (36.8 °C)      Vital signs stable, afebrile. Exam:  Patient without distress. HEENT: NCAT   PUL: normal respiratory effort   EXT: BL LE are negative for swelling, cords or tenderness. Lab/Data Review: All lab results for the last 24 hours reviewed. Assessment and Plan:  Patient appears to be having uncomplicated post-partum course. Continue routine perineal care and maternal education. Plan discharge for today with follow up in our office in 6 weeks with Dr. William Rea.

## 2022-02-23 NOTE — DISCHARGE SUMMARY
Obstetrical Discharge Summary     Name: Sheeba Fofana MRN: [id number]  SSN: [id number]    YOB: 1990  Age: 27 y.o. Sex: female      Allergies: Pertussis vaccines and Sulfa (sulfonamide antibiotics)    Admit Date: 2022    Discharge Date: 2022     Admitting Physician: Walt Wong MD     Attending Physician:  Aga Pereira DO     * Admission Diagnoses: Normal labor and delivery [O80]    * Discharge Diagnoses:   Information for the patient's :  Dustin Velasqueztiff [[id number]]   Delivery of a 6 lb 6.3 oz (2.9 kg) female infant via Vaginal, Spontaneous on 2022 at 9:00 AM  by Katy De La Rosa. Apgars were 8  and 9 .        Additional Diagnoses:   Hospital Problems as of 2022 Date Reviewed: 2022          Codes Class Noted - Resolved POA    * (Principal) Normal labor and delivery ICD-10-CM: O80  ICD-9-CM: 458  2022 - Present Unknown             Lab Results   Component Value Date/Time    ABO/Rh(D) AB POSITIVE 2022 05:09 PM    Rubella, External Immune 2021 12:00 AM    ABO,Rh AB positive 2021 12:00 AM      Immunization History   Administered Date(s) Administered    DTaP 1990    HPV (Quad) 10/03/2006, 2006, 2007    Hep B Vaccine (Adult) 1999, 10/18/1999, 2000    Hib 1991    Influenza Vaccine 10/31/2005, 10/03/2008, 2008    Influenza Vaccine (Quad) PF (>6 Mo Flulaval, Fluarix, and >3 Yrs Afluria, Fluzone 28601) 10/24/2014    MMR 1991, 1999    Meningococcal (C Conjugate) Vaccine 2007    Poliovirus vaccine 1990, 1990, 1991, 10/01/1994    TB Skin Test (PPD) Intradermal 2009, 2015    Td 1990, 1990, 1991, 10/01/1994, 10/03/2006    Varicella Virus Vaccine 1999, 2008       * Procedures:   * No surgery found *           * Discharge Condition: good    * Hospital Course: Normal hospital course following the delivery. * Disposition: Home    Discharge Medications:   Current Discharge Medication List      START taking these medications    Details   ibuprofen (MOTRIN) 800 mg tablet Take 1 Tablet by mouth every six (6) hours as needed for Pain. Qty: 60 Tablet, Refills: 2  Start date: 2/23/2022         CONTINUE these medications which have NOT CHANGED    Details   prenatal multivit-ca-min-fe-fa tab Take  by mouth. * Follow-up Care/Patient Instructions:   Activity: Activity as tolerated, No sex for 6 weeks and nothing in the vagina during that time  Diet: Regular Diet  Wound Care: None needed    Follow-up Information     Follow up With Specialties Details Why Contact Info    Olena, [facility]   [address],[facility] [address] [place] [address]  [phone]      None    None (822) Patient stated that they have no PCP      Stacey Blair, DO Obstetrics & Gynecology In 6 weeks  [address],[facility] [address] [facility] [address] [phone]

## 2024-04-18 NOTE — ANESTHESIA PROCEDURE NOTES
CSE Block    Start time: 2/21/2022 4:21 AM  End time: 2/21/2022 4:26 AM  Performed by: Odalys Cartwright MD  Authorized by: Odalys Cartwright MD     Pre-Procedure  Indications: at surgeon's request and primary anesthetic    preanesthetic checklist: patient identified, risks and benefits discussed, anesthesia consent, site marked, patient being monitored and timeout performed    Timeout Time: 04:19 EST        Procedure:   Patient Position:  Seated  Prep Region:  Lumbar  Prep: chlorhexidine    Location:  L2-3    Epidural Needle:   Needle Type:  Tuohy  Needle Gauge:  18 G  Injection Technique:  Loss of resistance using saline  Attempts:  1    Spinal Needle:   Needle Type:  Quincke  Needle Gauge:  25 G    Catheter:   Catheter Type:  Open end  Catheter Size:  20 G  Catheter at Skin Depth (cm):  8  Depth in Epidural Space (cm):  8  Events: no blood with aspiration, no cerebrospinal fluid with aspiration, no paresthesia and negative aspiration test    Test Dose:  Lidocaine 1.5% w/ epi and negative    Assessment:   Catheter Secured:  Tegaderm and tape  Insertion:  Uncomplicated  Patient tolerance:  Patient tolerated the procedure well with no immediate complications [Cerumen in canal] : cerumen in canal [Bilateral] : (bilateral) [Erythematous Oropharynx] : erythematous oropharynx [NL] : warm, clear